# Patient Record
Sex: MALE | Race: WHITE | Employment: OTHER | URBAN - METROPOLITAN AREA
[De-identification: names, ages, dates, MRNs, and addresses within clinical notes are randomized per-mention and may not be internally consistent; named-entity substitution may affect disease eponyms.]

---

## 2018-04-03 RX ORDER — COSYNTROPIN 0.25 MG/ML
0.25 INJECTION, POWDER, FOR SOLUTION INTRAMUSCULAR; INTRAVENOUS ONCE
Status: COMPLETED | OUTPATIENT
Start: 2018-04-05 | End: 2018-04-05

## 2018-04-05 ENCOUNTER — HOSPITAL ENCOUNTER (OUTPATIENT)
Dept: INFUSION THERAPY | Age: 58
Discharge: HOME | End: 2018-04-05
Payer: OTHER GOVERNMENT

## 2018-04-05 VITALS
BODY MASS INDEX: 34.53 KG/M2 | OXYGEN SATURATION: 97 % | TEMPERATURE: 98.7 F | HEIGHT: 67 IN | RESPIRATION RATE: 18 BRPM | DIASTOLIC BLOOD PRESSURE: 78 MMHG | HEART RATE: 70 BPM | WEIGHT: 220 LBS | SYSTOLIC BLOOD PRESSURE: 133 MMHG

## 2018-04-05 LAB
ANION GAP SERPL CALC-SCNC: 11 MMOL/L (ref 3–18)
BUN SERPL-MCNC: 27 MG/DL (ref 7–18)
BUN/CREAT SERPL: 23 (ref 12–20)
CALCIUM SERPL-MCNC: 8.8 MG/DL (ref 8.5–10.1)
CHLORIDE SERPL-SCNC: 105 MMOL/L (ref 100–108)
CO2 SERPL-SCNC: 25 MMOL/L (ref 21–32)
CORTIS AM PEAK SERPL-MCNC: 17.5 UG/DL (ref 4.3–22.4)
CORTIS SERPL-MCNC: 39 UG/DL (ref 3.09–22.4)
CORTIS SERPL-MCNC: 39.7 UG/DL (ref 3.09–22.4)
CREAT SERPL-MCNC: 1.16 MG/DL (ref 0.6–1.3)
GLUCOSE SERPL-MCNC: 131 MG/DL (ref 74–99)
MAGNESIUM SERPL-MCNC: 1.8 MG/DL (ref 1.6–2.6)
POTASSIUM SERPL-SCNC: 3.6 MMOL/L (ref 3.5–5.5)
SODIUM SERPL-SCNC: 141 MMOL/L (ref 136–145)

## 2018-04-05 PROCEDURE — 80048 BASIC METABOLIC PNL TOTAL CA: CPT

## 2018-04-05 PROCEDURE — 96374 THER/PROPH/DIAG INJ IV PUSH: CPT

## 2018-04-05 PROCEDURE — 82533 TOTAL CORTISOL: CPT | Performed by: INTERNAL MEDICINE

## 2018-04-05 PROCEDURE — 82533 TOTAL CORTISOL: CPT

## 2018-04-05 PROCEDURE — 74011250636 HC RX REV CODE- 250/636

## 2018-04-05 PROCEDURE — 83735 ASSAY OF MAGNESIUM: CPT

## 2018-04-05 RX ORDER — METHOCARBAMOL 500 MG/1
TABLET, FILM COATED ORAL 4 TIMES DAILY
COMMUNITY

## 2018-04-05 RX ORDER — ASPIRIN 81 MG/1
TABLET ORAL DAILY
COMMUNITY

## 2018-04-05 RX ORDER — PREGABALIN 25 MG/1
CAPSULE ORAL
COMMUNITY

## 2018-04-05 RX ORDER — ATORVASTATIN CALCIUM 40 MG/1
TABLET, FILM COATED ORAL DAILY
COMMUNITY

## 2018-04-05 RX ORDER — LIDOCAINE 50 MG/G
PATCH TOPICAL EVERY 24 HOURS
COMMUNITY

## 2018-04-05 RX ORDER — HYDROCODONE BITARTRATE AND ACETAMINOPHEN 10; 325 MG/1; MG/1
1 TABLET ORAL
COMMUNITY

## 2018-04-05 RX ORDER — FEXOFENADINE HCL AND PSEUDOEPHEDRINE HCI 180; 240 MG/1; MG/1
1 TABLET, EXTENDED RELEASE ORAL DAILY
COMMUNITY

## 2018-04-05 RX ORDER — EPINEPHRINE 0.3 MG/.3ML
0.3 INJECTION SUBCUTANEOUS
COMMUNITY

## 2018-04-05 RX ORDER — FLUTICASONE PROPIONATE 110 UG/1
AEROSOL, METERED RESPIRATORY (INHALATION)
COMMUNITY

## 2018-04-05 RX ORDER — AMLODIPINE BESYLATE 5 MG/1
5 TABLET ORAL DAILY
COMMUNITY

## 2018-04-05 RX ORDER — SODIUM CHLORIDE 0.9 % (FLUSH) 0.9 %
10-40 SYRINGE (ML) INJECTION AS NEEDED
Status: DISCONTINUED | OUTPATIENT
Start: 2018-04-05 | End: 2018-04-09 | Stop reason: HOSPADM

## 2018-04-05 RX ORDER — DULOXETIN HYDROCHLORIDE 30 MG/1
30 CAPSULE, DELAYED RELEASE ORAL DAILY
COMMUNITY

## 2018-04-05 RX ORDER — PANTOPRAZOLE SODIUM 20 MG/1
20 TABLET, DELAYED RELEASE ORAL DAILY
COMMUNITY

## 2018-04-05 RX ORDER — ROPINIROLE 1 MG/1
1 TABLET, FILM COATED ORAL 3 TIMES DAILY
COMMUNITY

## 2018-04-05 RX ORDER — DULOXETIN HYDROCHLORIDE 60 MG/1
60 CAPSULE, DELAYED RELEASE ORAL DAILY
COMMUNITY

## 2018-04-05 RX ORDER — GUAIFENESIN 600 MG/1
600 TABLET, EXTENDED RELEASE ORAL 2 TIMES DAILY
COMMUNITY

## 2018-04-05 RX ORDER — ALBUTEROL SULFATE 90 UG/1
AEROSOL, METERED RESPIRATORY (INHALATION)
COMMUNITY

## 2018-04-05 RX ADMIN — COSYNTROPIN 0.25 MG: 0.25 INJECTION, POWDER, LYOPHILIZED, FOR SOLUTION INTRAMUSCULAR; INTRAVENOUS at 08:50

## 2018-04-05 NOTE — PROGRESS NOTES
[facility] Progress Note    Date: 2018    Name: Adriano Hall    MRN: [id number]         : 1960    CST    Mr. Omari Grey arrived to [facility] at [id number]. Laboratory contacted via telephone to clarify correct procedure and type of tubes needed for test.  Spoke with two separate lab personnel specifically concerning the lab \"ACTH\". Label that printed in the [facility] did not specify the type of tube to be used. \"CKBOOK\" was printed on the label. Informed that the lab has a special book that is utilized for labels that print with the \"CKBOOK\" code. Lab personnel stated that a gold top tube should be drawn for the ACTH. Mr. Omari Grey was assessed and education was provided. Mr Omari Grey provided a printed copy of [facility] for cosyntropin and given the opportunity to ask questions. Mr. Chaim Rene vitals were reviewed. Visit Vitals    /81 (BP 1 Location: Right arm, BP Patient Position: Sitting)    Pulse 69    Temp 97.9 °F (36.6 °C)    Resp 18    Ht 5' 7\" (1.702 m)    Wt 99.8 kg (220 lb)    SpO2 97%    BMI 34.46 kg/m2       24g IV inserted in patient's Right forearm  X one attempt. Positive for blood return/ flushes without difficulty. Baseline cortisol, ACTH, BMP, and Mag drawn.     Recent Results (from the past 12 hour(s))   MAGNESIUM    Collection Time: 18  8:15 AM   Result Value Ref Range    Magnesium 1.8 1.6 - 2.6 mg/dL   METABOLIC PANEL, BASIC    Collection Time: 18  8:15 AM   Result Value Ref Range    Sodium 141 136 - 145 mmol/L    Potassium 3.6 3.5 - 5.5 mmol/L    Chloride 105 100 - 108 mmol/L    CO2 25 21 - 32 mmol/L    Anion gap 11 3.0 - 18 mmol/L    Glucose 131 (H) 74 - 99 mg/dL    BUN 27 (H) 7.0 - 18 MG/DL    Creatinine 1.16 0.6 - 1.3 MG/DL    BUN/Creatinine ratio 23 (H) 12 - 20      GFR est AA >60 >60 ml/min/1.73m2    GFR est non-AA >60 >60 ml/min/1.73m2    Calcium 8.8 8.5 - 10.1 MG/DL   CORTISOL, AM    Collection Time: 18  8:39 AM   Result Value Ref Range Cortisol, a.m. 17.5 4.30 - 22.40 ug/dL   CORTISOL    Collection Time: 04/05/18  9:20 AM   Result Value Ref Range    Cortisol, random 39.0 (H) 3.09 - 22.40 ug/dL   CORTISOL    Collection Time: 04/05/18  9:50 AM   Result Value Ref Range    Cortisol, random 39.7 (H) 3.09 - 22.40 ug/dL         0.25 mg Cosyntropin administered  administered as ordered followed by NS flush. 30 minutes after Cosyntropin administered, lab drawn for cortisol. 60 minutes after Cosyntropin administered, third and final lab drawn for cortisol. Mr. Jericho Aguayo tolerated well without complaints. IV removed/ intact. Gauze/ coban to site. Mr. Jericho Aguayo was discharged from [place] [address] in stable condition at 1000. After Mr Jericho Aguayo was discharged, informed by lab personnel that the [address] should have been drawn in a purple top and that the test could not be completed. Dr Oseas Carey office contacted. Informed Kristofer Hurst, Dr Oseas Carey nurse , that the [address] was drawn in the wrong tube and that the CST was unable to be completed. Left voicemail with Dr Virk Narrow him of the situation. As of 1630, no return call received from Dr Alisia Sullivan.     Nadege Pittman, LUCERO  April 5, 2018

## 2018-04-06 NOTE — PROGRESS NOTES
[facility] OPI Progress Note    Date: 2018    Name: Vane Gutierrez    MRN: [id number]         : 1960       Spoke with Dr Gary Vanessa via telephone this morning. Dr Gary Vanessa stated that it is not necessary for Mr Chad Sinclair to repeat the CST, and that he has everything that he needs from the [facility] that was able to be completed.     Gilda Menchaca RN  2018